# Patient Record
Sex: MALE | Race: WHITE | NOT HISPANIC OR LATINO | Employment: FULL TIME | URBAN - METROPOLITAN AREA
[De-identification: names, ages, dates, MRNs, and addresses within clinical notes are randomized per-mention and may not be internally consistent; named-entity substitution may affect disease eponyms.]

---

## 2023-08-31 ENCOUNTER — OFFICE VISIT (OUTPATIENT)
Dept: PRIMARY CARE | Facility: CLINIC | Age: 38
End: 2023-08-31
Payer: COMMERCIAL

## 2023-08-31 VITALS
BODY MASS INDEX: 27.4 KG/M2 | WEIGHT: 185 LBS | DIASTOLIC BLOOD PRESSURE: 65 MMHG | HEART RATE: 70 BPM | SYSTOLIC BLOOD PRESSURE: 102 MMHG | OXYGEN SATURATION: 97 % | TEMPERATURE: 97.9 F | HEIGHT: 69 IN

## 2023-08-31 DIAGNOSIS — Z00.00 ENCOUNTER FOR WELLNESS EXAMINATION: Primary | ICD-10-CM

## 2023-08-31 PROCEDURE — 1036F TOBACCO NON-USER: CPT | Performed by: INTERNAL MEDICINE

## 2023-08-31 PROCEDURE — 99395 PREV VISIT EST AGE 18-39: CPT | Performed by: INTERNAL MEDICINE

## 2023-08-31 ASSESSMENT — PROMIS GLOBAL HEALTH SCALE
EMOTIONAL_PROBLEMS: RARELY
CARRYOUT_SOCIAL_ACTIVITIES: VERY GOOD
RATE_AVERAGE_FATIGUE: MILD
RATE_QUALITY_OF_LIFE: VERY GOOD
RATE_AVERAGE_PAIN: 0
RATE_SOCIAL_SATISFACTION: GOOD
CARRYOUT_PHYSICAL_ACTIVITIES: COMPLETELY
RATE_PHYSICAL_HEALTH: VERY GOOD
RATE_GENERAL_HEALTH: VERY GOOD
RATE_MENTAL_HEALTH: GOOD

## 2023-08-31 NOTE — PROGRESS NOTES
"Subjective   Collin Reyes is a 38 y.o. male who presents for Annual Exam.  HPI    Interim:  - perforated eardrum, saw [facility] on 7/24/2023, has follow-up doing well    No significant past medical history.     Sees a therapist every few weeks, anxiety, controlled, never on medications.          He exercises regularly, some weight lifting. He golfs in the the summer, walks the course.  Diet is fairly healthy.     Social Hx: Lives with his wife and two children (3 and 5 y.o.) in [place]. He works as a . He is originally from [place], [place] area, but has lived in [place] since 2014.  Has 3-5 alcoholic drinks a week on average, social drinker.  Denies smoking cigarettes, he has smoked cigars before on rare special occasions, but not recently.       Objective   /65   Pulse 70   Temp 36.6 °C (97.9 °F)   Ht 1.753 m (5' 9\")   Wt 83.9 kg (185 lb)   SpO2 97%   BMI 27.32 kg/m²    Physical Exam  Gen: NAD, pleasant, A&;Ox3  HEENT: PERRL, EOMI, MMM, OP clear,L TM healing well, no perforation  Neck: supple, no thyromegaly, no JVD, normal carotid upstroke  Pulm: lungs CTAB, good air movement  CV: RRR, no m/r/g, 2+ DP pulses  Abd: NABS, soft, NT, ND no HSM  Ext: no peripheral edema  Neuro: CN II-XII intact, no focal sensory or motor deficits, normal reflexes    Assessment/Plan        General Health Maintenance:  -metabolic screen: Unremarkable, 2022  - Last colonoscopy: N/A  - PSA: no family hx prostate cancer, can offer baseline PSA in his 40s  -Smoking history: none, has smoked cigars once or twice a year in past on special occasion  -Counseled regarding diet and exercise  -Immunizations: Appears to be current, continue with annual influenza, possible COVID booster this year  -Follow up annually and PRN  Problem List Items Addressed This Visit    None           Gold Callahan MD   "

## 2023-11-05 PROBLEM — L82.1 OTHER SEBORRHEIC KERATOSIS: Status: ACTIVE | Noted: 2023-06-06

## 2023-11-05 PROBLEM — R06.83 SNORING: Status: ACTIVE | Noted: 2023-11-05

## 2023-11-05 PROBLEM — D22.60 MELANOCYTIC NEVI OF UNSPECIFIED UPPER LIMB, INCLUDING SHOULDER: Status: ACTIVE | Noted: 2023-06-06

## 2023-11-05 PROBLEM — L80 VITILIGO: Status: ACTIVE | Noted: 2023-06-06

## 2023-11-05 PROBLEM — D22.70 MELANOCYTIC NEVI OF LOWER LIMB, INCLUDING HIP: Status: ACTIVE | Noted: 2023-06-06

## 2023-11-05 PROBLEM — M25.512 LEFT SHOULDER PAIN: Status: ACTIVE | Noted: 2023-11-05

## 2023-11-05 PROBLEM — D18.01 HEMANGIOMA OF SKIN AND SUBCUTANEOUS TISSUE: Status: ACTIVE | Noted: 2023-06-06

## 2023-11-05 PROBLEM — J34.3 HYPERTROPHY OF NASAL TURBINATES: Status: ACTIVE | Noted: 2023-11-05

## 2023-11-05 PROBLEM — T14.8XXA PUNCTURE WOUND: Status: ACTIVE | Noted: 2023-11-05

## 2023-11-05 PROBLEM — H66.92 LEFT OTITIS MEDIA: Status: ACTIVE | Noted: 2023-11-05

## 2023-11-05 PROBLEM — S09.22XS: Status: ACTIVE | Noted: 2023-11-05

## 2023-11-05 PROBLEM — G47.19 EXCESSIVE DAYTIME SLEEPINESS: Status: ACTIVE | Noted: 2023-11-05

## 2023-11-05 PROBLEM — Z86.69 HISTORY OF TINNITUS: Status: ACTIVE | Noted: 2023-11-05

## 2023-11-05 PROBLEM — M79.602 LEFT ARM PAIN: Status: ACTIVE | Noted: 2023-11-05

## 2023-11-05 PROBLEM — D22.5 MELANOCYTIC NEVI OF TRUNK: Status: ACTIVE | Noted: 2023-06-06

## 2023-11-05 PROBLEM — G47.9 REPETITIVE INTRUSIONS OF SLEEP: Status: ACTIVE | Noted: 2023-11-05

## 2023-11-05 PROBLEM — K13.79 ELONGATED UVULA, ACQUIRED: Status: ACTIVE | Noted: 2023-11-05

## 2023-11-05 PROBLEM — L81.4 OTHER MELANIN HYPERPIGMENTATION: Status: ACTIVE | Noted: 2023-06-06

## 2023-11-05 PROBLEM — J34.2 NASAL SEPTAL DEVIATION: Status: ACTIVE | Noted: 2023-11-05

## 2023-11-07 ENCOUNTER — OFFICE VISIT (OUTPATIENT)
Dept: OTOLARYNGOLOGY | Facility: CLINIC | Age: 38
End: 2023-11-07
Payer: COMMERCIAL

## 2023-11-07 VITALS — WEIGHT: 185 LBS | BODY MASS INDEX: 27.32 KG/M2

## 2023-11-07 DIAGNOSIS — Z86.69 HISTORY OF PERFORATION OF TYMPANIC MEMBRANE: Primary | ICD-10-CM

## 2023-11-07 PROBLEM — S09.22XS: Status: RESOLVED | Noted: 2023-11-05 | Resolved: 2023-11-07

## 2023-11-07 PROCEDURE — 99213 OFFICE O/P EST LOW 20 MIN: CPT | Performed by: GENERAL PRACTICE

## 2023-11-07 PROCEDURE — 1036F TOBACCO NON-USER: CPT | Performed by: GENERAL PRACTICE

## 2023-11-07 ASSESSMENT — PATIENT HEALTH QUESTIONNAIRE - PHQ9
SUM OF ALL RESPONSES TO PHQ9 QUESTIONS 1 AND 2: 0
1. LITTLE INTEREST OR PLEASURE IN DOING THINGS: NOT AT ALL
2. FEELING DOWN, DEPRESSED OR HOPELESS: NOT AT ALL

## 2023-11-07 NOTE — PROGRESS NOTES
Otolaryngology - Head and Neck Surgery Outpatient New Patient Visit Note        Assessment/Plan   Problem List Items Addressed This Visit    None  Visit Diagnoses         Codes    History of perforation of tympanic membrane    -  Primary Z86.69            Well healed.  No subjective hearing loss.  Discussed possible audio but pt declines.         Follow up:  -plan for follow up in clinic as needed    All of the above findings, impressions, treatment planning and follow up plans were discussed with the patient who indicated understanding.  the patient was instructed to contact or return to clinic sooner if symptoms/signs persist or worsen despite the above management.      Hebert Bush MD  Otolaryngology - Head and Neck Surgery            History Of Present Illness  Collin Reyes is a 38 y.o. male presenting for eval of prior TM perforation on the left side.  Observed for resolution.   No ongoing hearing loss, otalgia, otorrhea, tinnitus.  Pt able to clear ears without difficulty.                Past Medical History  He has a past medical history of Anxiety, Migraine, unspecified, not intractable, without status migrainosus, Personal history of other specified conditions, and Snoring (03/01/2017).    Surgical History  He has a past surgical history that includes Appendectomy (07/13/2016); Uvulopalatopharyngoplasty (03/16/2019); Nasal septum surgery (08/30/2022); and Vasectomy.     Social History  He reports that he has never smoked. He has never used smokeless tobacco. He reports current alcohol use of about 2.0 standard drinks of alcohol per week. He reports current drug use. Frequency: 2.00 times per week. Drug: Marijuana.    Family History  Family History   Problem Relation Name Age of Onset    No Known Problems Mother      Hypertension Father      Diabetes Maternal Grandmother Herminia aVrner     Stroke Maternal Grandmother Herminia Varner     Stroke Paternal Grandmother Herminia Reyes     Diabetes Other Grandparent          Allergies  Penicillins    Review of Systems  ROS: Pertinent positives as noted in HPI.    - CONSTITUTIONAL: Does not report weight loss, fever or chills.    - HEENT:   Ear: Does not report tinnitus, vertigo, hearing loss, otalgia, otorrhea  Nose: Does not report congestion, rhinorrhea, epistaxis, decreased smell  Throat: Does not report pain, dysphagia, odynophagia  Larynx: Does not report hoarseness,  difficulty breathing, pain with speaking (odynophonia)  Neck: Does not report new masses, pain, swelling  Face: Does not report sinus pain, pressure, swelling, numbness, weakness     - RESPIRATORY: Does not report SOB or cough.    - CV: Does not report palpitations or chest pain.     - GI: Does not report abdominal pain, nausea, vomiting or diarrhea.    - : Does not report dysuria or urinary frequency.    - MSK: Does not report myalgia or joint pain.    - SKIN: Does not report rash or pruritus.    - NEUROLOGICAL: Does not report headache or syncope.    - PSYCHIATRIC: Does not report recent changes in mood. Does not report anxiety or depression.         Physical Exam:     GENERAL: Alert & Oriented to person, place and time; Normal affect and appearance. Well developed and well nourished. Conversant & cooperative with examination.      HEAD: Normocephalic, atraumatic. No sinus tenderness to palpation. Normal parotid bilaterally. Normal facial strength.      NEUROLOGIC: Cranial nerves II-XII grossly intact, gait WNL. Normal mood and affect.     EYES: Extraocular movements intact. Pupils equal, round, reactive to light and accommodation. No nystagmus, no ptosis. no scleral injection.     EAR: Normal auricle. No discomfort or TTP with manipulation. Otoscopic exam showed normal external auditory canals bilaterally. No purulence or EAC inflammation. Minimal cerumen. Right tympanic membrane clear and mobile without evidence of perforation, retraction or middle ear effusion. Left tympanic membrane hyperemic with no visible  perforation.  fofana midline with AC>BC b/l.      NOSE: No external deformity. No external nasal lesions, lacerations, or scars. Nasal tip symmetrical with normal nasal valves. Nasal cavity with rightward septum, normal mucosa and turbinates. No lesions, masses, purulence or polyps.      OC/OP: Mucous membranes moist, no masses, lesions or exudates. Normal tongue, floor of mouth, teeth, gums, lips. Normal posterior pharyngeal wall. Normal tonsils without erythema, exudate or obvious calculi      NECK: No neck masses or thyroid enlargement. Trachea midline. No tenderness to palpation     LYMPHATIC: No cervical lymphadenopathy.      RESPIRATORY: Symmetric chest elevation & no retractions. No significant hoarseness. No increased work of breathing.     CV: No clubbing or cyanosis. No obvious edema     Skin: no facial rashes, vesicles or lesions.      Extremities: no gross abnormalities      Clinic Procedure        Information review:  External sources (notes, imaging, lab results) listed below personally reviewed to aid in medical decision making process.  -  -  -

## 2024-06-06 ENCOUNTER — OFFICE VISIT (OUTPATIENT)
Dept: DERMATOLOGY | Facility: CLINIC | Age: 39
End: 2024-06-06
Payer: COMMERCIAL

## 2024-06-06 DIAGNOSIS — L81.4 LENTIGO: ICD-10-CM

## 2024-06-06 DIAGNOSIS — Z12.83 SCREENING EXAM FOR SKIN CANCER: ICD-10-CM

## 2024-06-06 DIAGNOSIS — D22.9 MULTIPLE BENIGN NEVI: Primary | ICD-10-CM

## 2024-06-06 DIAGNOSIS — L80 VITILIGO: ICD-10-CM

## 2024-06-06 PROBLEM — D22.60 MELANOCYTIC NEVI OF UNSPECIFIED UPPER LIMB, INCLUDING SHOULDER: Status: RESOLVED | Noted: 2023-06-06 | Resolved: 2024-06-06

## 2024-06-06 PROBLEM — L82.1 OTHER SEBORRHEIC KERATOSIS: Status: RESOLVED | Noted: 2023-06-06 | Resolved: 2024-06-06

## 2024-06-06 PROBLEM — D18.01 HEMANGIOMA OF SKIN AND SUBCUTANEOUS TISSUE: Status: RESOLVED | Noted: 2023-06-06 | Resolved: 2024-06-06

## 2024-06-06 PROBLEM — D22.70 MELANOCYTIC NEVI OF LOWER LIMB, INCLUDING HIP: Status: RESOLVED | Noted: 2023-06-06 | Resolved: 2024-06-06

## 2024-06-06 PROCEDURE — 99213 OFFICE O/P EST LOW 20 MIN: CPT | Performed by: DERMATOLOGY

## 2024-06-06 PROCEDURE — 1036F TOBACCO NON-USER: CPT | Performed by: DERMATOLOGY

## 2024-06-06 ASSESSMENT — PATIENT GLOBAL ASSESSMENT (PGA): PATIENT GLOBAL ASSESSMENT: PATIENT GLOBAL ASSESSMENT:  1 - CLEAR

## 2024-06-06 ASSESSMENT — DERMATOLOGY QUALITY OF LIFE (QOL) ASSESSMENT
ARE THERE EXCLUSIONS OR EXCEPTIONS FOR THE QUALITY OF LIFE ASSESSMENT: NO
RATE HOW BOTHERED YOU ARE BY EFFECTS OF YOUR SKIN PROBLEMS ON YOUR ACTIVITIES (EG, GOING OUT, ACCOMPLISHING WHAT YOU WANT, WORK ACTIVITIES OR YOUR RELATIONSHIPS WITH OTHERS): 0 - NEVER BOTHERED
DATE THE QUALITY-OF-LIFE ASSESSMENT WAS COMPLETED: 66997
WHAT SINGLE SKIN CONDITION LISTED BELOW IS THE PATIENT ANSWERING THE QUALITY-OF-LIFE ASSESSMENT QUESTIONS ABOUT: NONE OF THE ABOVE
RATE HOW EMOTIONALLY BOTHERED YOU ARE BY YOUR SKIN PROBLEM (FOR EXAMPLE, WORRY, EMBARRASSMENT, FRUSTRATION): 0 - NEVER BOTHERED
RATE HOW BOTHERED YOU ARE BY SYMPTOMS OF YOUR SKIN PROBLEM (EG, ITCHING, STINGING BURNING, HURTING OR SKIN IRRITATION): 0 - NEVER BOTHERED

## 2024-06-06 ASSESSMENT — ITCH NUMERIC RATING SCALE: HOW SEVERE IS YOUR ITCHING?: 0

## 2024-06-06 NOTE — PROGRESS NOTES
Subjective     Collin Reyes is a 39 y.o. male who presents for the following: Skin Check (Annual FBSE).     Last derm visit 6/6/23 for Full Skin Exam - halo nevi stable in the setting of vitiligo as compared to photography    Review of Systems:  No other skin or systemic complaints other than what is documented elsewhere in the note.    The following portions of the chart were reviewed this encounter and updated as appropriate:  Tobacco  Allergies  Meds  Problems  Med Hx  Surg Hx         Skin Cancer History  No skin cancer on file.      Specialty Problems          Dermatology Problems    Melanocytic nevi of trunk    Vitiligo    Puncture wound    Halo nevus        Objective   Well appearing patient in no apparent distress; mood and affect are within normal limits.    A full examination was performed including scalp, head, eyes, ears, nose, lips, neck, chest, axillae, abdomen, back, buttocks, bilateral upper extremities, bilateral lower extremities, hands, feet, fingers, toes, fingernails, and toenails. All findings within normal limits unless otherwise noted below.    Assessment/Plan   1. Multiple benign nevi  Brown and tan macules and papules with reassuring findings on dermoscopy    Halo nevi in the setting of known vitiligo stable. Cont to monitor clinically. Repeat photos taken in new system for higher resolution          -These lesions have benign, reassuring patterns on dermoscopy  -Recommend continued self observation, and to contact the office if any changes in nevi are noticed    2. Lentigo  Tan macules    -Benign appearing on exam  -Reassurance, recommend observation    3. Screening exam for skin cancer      Full body skin exam  -No lesions concerning for malignancy on the remainder the skin exam today   - The ugly duckling sign was discussed. Monitor for any skin lesions that are different in color, shape, or size than others on body  -Sun protection was discussed. Recommend SPF 30+, hats with  brims, sun protective clothing, and avoiding sun exposure between 10 AM and 2 PM whenever possible  -Recommend regular skin exams or sooner if new or changing lesions       Related Procedures  Follow Up In Dermatology - Established Patient    4. Vitiligo  Well-demarcated depigmented macule(s)/patch(es)    -Discussed nature of condition  -Reviewed treatment options  -Discussed associated photosensitivity with depigmented skin and the need for sun protection & avoidance to reduce the risk of skin cancer    - limited skin disease, declines treatment        Follow up 1 year Full Skin Exam or sooner as needed

## 2024-09-05 ENCOUNTER — APPOINTMENT (OUTPATIENT)
Dept: PRIMARY CARE | Facility: CLINIC | Age: 39
End: 2024-09-05
Payer: COMMERCIAL

## 2024-09-05 VITALS
DIASTOLIC BLOOD PRESSURE: 79 MMHG | WEIGHT: 180.6 LBS | OXYGEN SATURATION: 96 % | BODY MASS INDEX: 25.86 KG/M2 | SYSTOLIC BLOOD PRESSURE: 126 MMHG | HEIGHT: 70 IN | TEMPERATURE: 96.9 F | HEART RATE: 73 BPM

## 2024-09-05 DIAGNOSIS — Z00.00 ENCOUNTER FOR WELLNESS EXAMINATION: Primary | ICD-10-CM

## 2024-09-05 PROCEDURE — 3008F BODY MASS INDEX DOCD: CPT | Performed by: INTERNAL MEDICINE

## 2024-09-05 PROCEDURE — 99396 PREV VISIT EST AGE 40-64: CPT | Performed by: INTERNAL MEDICINE

## 2024-09-05 PROCEDURE — 1036F TOBACCO NON-USER: CPT | Performed by: INTERNAL MEDICINE

## 2024-09-05 ASSESSMENT — PAIN SCALES - GENERAL: PAINLEVEL: 0-NO PAIN

## 2024-09-05 NOTE — PROGRESS NOTES
"Subjective   Collin Reyes is a 39 y.o. male who presents for Annual Exam.  HPI    Interim:  - derm, 6/6/2024, skin check    No significant past medical history.     Sees a therapist every 4-6 weeks for anxiety, controlled, never on medications.        He is not exercising regularly.  He golfs in the the summer, walks the course.  Diet is fairly healthy.     Social Hx: Lives with his wife and two children (5yo daughter and 6 y.o. son) in [place]. He works as a . He is originally from [place], [place] area, but has lived in [place] since 2014.  Has 3-5 alcoholic drinks a week on average, social drinker.  Non-smoker.       Objective   /79 (BP Location: Left arm, Patient Position: Sitting, BP Cuff Size: Adult)   Pulse 73   Temp 36.1 °C (96.9 °F) (Temporal)   Ht 1.778 m (5' 10\")   Wt 81.9 kg (180 lb 9.6 oz)   SpO2 96%   BMI 25.91 kg/m²    Physical Exam  Gen: NAD, pleasant, A&;Ox3  HEENT: PERRL, EOMI, MMM, OP clear  Neck: supple, no thyromegaly, no JVD, normal carotid upstroke  Pulm: lungs CTAB, good air movement  CV: RRR, no m/r/g, 2+ DP pulses  Abd: NABS, soft, NT, ND no HSM  Ext: no peripheral edema  Neuro: CN II-XII intact, no focal sensory or motor deficits, normal reflexes    Assessment/Plan        General Health Maintenance:  -metabolic screen: Unremarkable, 2022  - Last colonoscopy: initiate at 43yo  - PSA: no family hx prostate cancer  -Smoking history: none  -Counseled regarding diet and exercise  -Immunizations: Appears to be current, continue with annual influenza, possible COVID booster this year  -Follow up annually and PRN  Problem List Items Addressed This Visit    None           Gold Callahan MD   "

## 2025-05-04 ENCOUNTER — HOSPITAL ENCOUNTER (EMERGENCY)
Facility: HOSPITAL | Age: 40
Discharge: HOME | End: 2025-05-04
Payer: COMMERCIAL

## 2025-05-04 VITALS
RESPIRATION RATE: 18 BRPM | TEMPERATURE: 98.1 F | WEIGHT: 181 LBS | BODY MASS INDEX: 25.91 KG/M2 | SYSTOLIC BLOOD PRESSURE: 123 MMHG | HEIGHT: 70 IN | OXYGEN SATURATION: 99 % | DIASTOLIC BLOOD PRESSURE: 82 MMHG | HEART RATE: 95 BPM

## 2025-05-04 DIAGNOSIS — Z23 NEED FOR TDAP VACCINATION: ICD-10-CM

## 2025-05-04 DIAGNOSIS — S61.211A LACERATION OF LEFT INDEX FINGER WITHOUT FOREIGN BODY WITHOUT DAMAGE TO NAIL, INITIAL ENCOUNTER: Primary | ICD-10-CM

## 2025-05-04 PROCEDURE — 90471 IMMUNIZATION ADMIN: CPT | Performed by: PHYSICIAN ASSISTANT

## 2025-05-04 PROCEDURE — 12001 RPR S/N/AX/GEN/TRNK 2.5CM/<: CPT | Performed by: PHYSICIAN ASSISTANT

## 2025-05-04 PROCEDURE — 2500000004 HC RX 250 GENERAL PHARMACY W/ HCPCS (ALT 636 FOR OP/ED): Mod: JZ | Performed by: PHYSICIAN ASSISTANT

## 2025-05-04 PROCEDURE — 2500000005 HC RX 250 GENERAL PHARMACY W/O HCPCS: Performed by: PHYSICIAN ASSISTANT

## 2025-05-04 PROCEDURE — 90715 TDAP VACCINE 7 YRS/> IM: CPT | Mod: JZ | Performed by: PHYSICIAN ASSISTANT

## 2025-05-04 PROCEDURE — 99283 EMERGENCY DEPT VISIT LOW MDM: CPT

## 2025-05-04 RX ORDER — BACITRACIN ZINC 500 UNIT/G
OINTMENT IN PACKET (EA) TOPICAL ONCE
Status: COMPLETED | OUTPATIENT
Start: 2025-05-04 | End: 2025-05-04

## 2025-05-04 RX ADMIN — BACITRACIN 1 APPLICATION: 500 OINTMENT TOPICAL at 18:26

## 2025-05-04 RX ADMIN — TETANUS TOXOID, REDUCED DIPHTHERIA TOXOID AND ACELLULAR PERTUSSIS VACCINE, ADSORBED 0.5 ML: 5; 2.5; 8; 8; 2.5 SUSPENSION INTRAMUSCULAR at 18:26

## 2025-05-04 ASSESSMENT — PAIN - FUNCTIONAL ASSESSMENT: PAIN_FUNCTIONAL_ASSESSMENT: 0-10

## 2025-05-04 ASSESSMENT — COLUMBIA-SUICIDE SEVERITY RATING SCALE - C-SSRS
6. HAVE YOU EVER DONE ANYTHING, STARTED TO DO ANYTHING, OR PREPARED TO DO ANYTHING TO END YOUR LIFE?: NO
2. HAVE YOU ACTUALLY HAD ANY THOUGHTS OF KILLING YOURSELF?: NO
1. IN THE PAST MONTH, HAVE YOU WISHED YOU WERE DEAD OR WISHED YOU COULD GO TO SLEEP AND NOT WAKE UP?: NO

## 2025-05-04 ASSESSMENT — PAIN SCALES - GENERAL: PAINLEVEL_OUTOF10: 1

## 2025-05-04 NOTE — ED TRIAGE NOTES
Pt was cutting bread and cut his left index finger on a knife. Pt denies any pain at this time bleeding controlled. Dressing placed in triage.

## 2025-05-04 NOTE — ED PROVIDER NOTES
HPI   Chief Complaint   Patient presents with    Laceration       Pleasant 40-year-old male lacerated his left index finger prior to arrival on a knife while chopping bread denies other injury or suspicion of foreign body able to move the finger.  Last tetanus shot was 2018.              Patient History   Medical History[1]  Surgical History[2]  Family History[3]  Social History[4]    Physical Exam   ED Triage Vitals [05/04/25 1747]   Temperature Heart Rate Respirations BP   36.7 °C (98.1 °F) 95 18 123/82      Pulse Ox Temp Source Heart Rate Source Patient Position   99 % Temporal -- --      BP Location FiO2 (%)     Right arm --       Physical Exam  Vitals and nursing note reviewed.   Constitutional:       General: He is not in acute distress.     Appearance: Normal appearance. He is normal weight. He is not ill-appearing, toxic-appearing or diaphoretic.   HENT:      Head: Normocephalic and atraumatic.      Right Ear: External ear normal.      Left Ear: External ear normal.      Nose: Nose normal.      Mouth/Throat:      Pharynx: No oropharyngeal exudate.   Eyes:      Extraocular Movements: Extraocular movements intact.      Conjunctiva/sclera: Conjunctivae normal.      Pupils: Pupils are equal, round, and reactive to light.   Pulmonary:      Effort: Pulmonary effort is normal. No respiratory distress.      Breath sounds: No stridor.   Abdominal:      General: There is no distension.      Tenderness: There is no right CVA tenderness, left CVA tenderness, guarding or rebound.   Musculoskeletal:         General: Tenderness and signs of injury present. No swelling or deformity.      Cervical back: Normal range of motion.   Skin:     General: Skin is warm.      Capillary Refill: Capillary refill takes less than 2 seconds.      Findings: No rash.   Neurological:      General: No focal deficit present.      Mental Status: He is alert and oriented to person, place, and time. Mental status is at baseline.      Cranial  Nerves: No cranial nerve deficit.      Sensory: No sensory deficit.      Motor: No weakness.      Coordination: Coordination normal.   Psychiatric:         Mood and Affect: Mood normal.         Behavior: Behavior normal.         Thought Content: Thought content normal.         Judgment: Judgment normal.           ED Course & MDM   Diagnoses as of 05/04/25 1833   Laceration of left index finger without foreign body without damage to nail, initial encounter   Need for Tdap vaccination                 No data recorded                                 Medical Decision Making  Ddx: fracture, lac, need for tdap.             Procedure  Laceration Repair    Performed by: Juan Edmonds PA-C  Authorized by: Juan Edmonds PA-C    Consent:     Consent obtained:  Verbal    Consent given by:  Patient    Risks discussed:  Infection, pain and retained foreign body    Alternatives discussed:  No treatment  Universal protocol:     Procedure explained and questions answered to patient or proxy's satisfaction: yes      Immediately prior to procedure, a time out was called: yes      Patient identity confirmed:  Verbally with patient  Laceration details:     Location:  Finger    Finger location:  L index finger    Length (cm):  2  Pre-procedure details:     Preparation:  Patient was prepped and draped in usual sterile fashion  Exploration:     Hemostasis achieved with:  Direct pressure    Wound exploration: wound explored through full range of motion and entire depth of wound visualized      Contaminated: no    Treatment:     Area cleansed with:  Saline    Amount of cleaning:  Standard    Irrigation solution:  Sterile saline  Skin repair:     Repair method:  Sutures    Suture size:  4-0    Suture material:  Nylon    Suture technique:  Simple interrupted    Number of sutures:  4  Repair type:     Repair type:  Simple  Post-procedure details:     Dressing:  Antibiotic ointment and adhesive bandage    Procedure completion:  Tolerated well,  no immediate complications         [1]   Past Medical History:  Diagnosis Date    Anxiety     Migraine, unspecified, not intractable, without status migrainosus     Migraine    Personal history of other specified conditions     History of chest pain    Snoring 03/01/2017    Snoring   [2]   Past Surgical History:  Procedure Laterality Date    APPENDECTOMY  07/13/2016    Appendectomy    NASAL SEPTUM SURGERY  08/30/2022    Nasal septoplasty    UVULOPALATOPHARYNGOPLASTY  03/16/2019    Uvulectomy    VASECTOMY     [3]   Family History  Problem Relation Name Age of Onset    No Known Problems Mother      Hypertension Father      Diabetes Maternal Grandmother Herminia Varner     Stroke Maternal Grandmother Herminia Varner     Stroke Paternal Grandmother Herminia Reyes     Diabetes Other Grandparent    [4]   Social History  Tobacco Use    Smoking status: Never    Smokeless tobacco: Never   Vaping Use    Vaping status: Some Days    Substances: THC   Substance Use Topics    Alcohol use: Yes     Alcohol/week: 2.0 standard drinks of alcohol     Types: 2 Standard drinks or equivalent per week    Drug use: Yes     Frequency: 2.0 times per week     Types: Marijuana     Comment: Lacie Edmonds PA-C  05/04/25 [id number]

## 2025-06-10 ENCOUNTER — APPOINTMENT (OUTPATIENT)
Dept: DERMATOLOGY | Facility: CLINIC | Age: 40
End: 2025-06-10
Payer: COMMERCIAL

## 2025-06-10 DIAGNOSIS — D22.9 MULTIPLE BENIGN NEVI: Primary | ICD-10-CM

## 2025-06-10 DIAGNOSIS — L80 VITILIGO: ICD-10-CM

## 2025-06-10 DIAGNOSIS — L81.4 LENTIGO: ICD-10-CM

## 2025-06-10 DIAGNOSIS — B35.6 TINEA CRURIS: ICD-10-CM

## 2025-06-10 DIAGNOSIS — Z12.83 SCREENING EXAM FOR SKIN CANCER: ICD-10-CM

## 2025-06-10 PROCEDURE — 99214 OFFICE O/P EST MOD 30 MIN: CPT | Performed by: DERMATOLOGY

## 2025-06-10 PROCEDURE — 1036F TOBACCO NON-USER: CPT | Performed by: DERMATOLOGY

## 2025-06-10 RX ORDER — KETOCONAZOLE 20 MG/ML
SHAMPOO, SUSPENSION TOPICAL DAILY
Qty: 120 ML | Refills: 11 | Status: SHIPPED | OUTPATIENT
Start: 2025-06-10

## 2025-06-10 RX ORDER — KETOCONAZOLE 20 MG/G
CREAM TOPICAL 2 TIMES DAILY
Qty: 60 G | Refills: 11 | Status: SHIPPED | OUTPATIENT
Start: 2025-06-10

## 2025-06-10 ASSESSMENT — PATIENT GLOBAL ASSESSMENT (PGA): PATIENT GLOBAL ASSESSMENT: PATIENT GLOBAL ASSESSMENT:  1 - CLEAR

## 2025-06-10 ASSESSMENT — DERMATOLOGY PATIENT ASSESSMENT
DO YOU HAVE ANY NEW OR CHANGING LESIONS: NO
HAVE YOU HAD OR DO YOU HAVE VASCULAR DISEASE: NO
DO YOU USE SUNSCREEN: DAILY
ARE YOU AN ORGAN TRANSPLANT RECIPIENT: NO
DO YOU USE A TANNING BED: NO
HAVE YOU HAD OR DO YOU HAVE A STAPH INFECTION: NO

## 2025-06-10 ASSESSMENT — DERMATOLOGY QUALITY OF LIFE (QOL) ASSESSMENT
RATE HOW EMOTIONALLY BOTHERED YOU ARE BY YOUR SKIN PROBLEM (FOR EXAMPLE, WORRY, EMBARRASSMENT, FRUSTRATION): 0 - NEVER BOTHERED
DATE THE QUALITY-OF-LIFE ASSESSMENT WAS COMPLETED: 67366
RATE HOW BOTHERED YOU ARE BY SYMPTOMS OF YOUR SKIN PROBLEM (EG, ITCHING, STINGING BURNING, HURTING OR SKIN IRRITATION): 0 - NEVER BOTHERED
ARE THERE EXCLUSIONS OR EXCEPTIONS FOR THE QUALITY OF LIFE ASSESSMENT: NO
RATE HOW BOTHERED YOU ARE BY EFFECTS OF YOUR SKIN PROBLEMS ON YOUR ACTIVITIES (EG, GOING OUT, ACCOMPLISHING WHAT YOU WANT, WORK ACTIVITIES OR YOUR RELATIONSHIPS WITH OTHERS): 0 - NEVER BOTHERED

## 2025-06-10 ASSESSMENT — ITCH NUMERIC RATING SCALE: HOW SEVERE IS YOUR ITCHING?: 0

## 2025-06-10 NOTE — Clinical Note
-These lesions have benign, reassuring patterns on dermoscopy  -Recommend continued self observation, and to contact the office if any changes in nevi are noticed   No protocol for requested medication.    Medication: ondansetron  Last office visit date: 11/12/24  Pharmacy: [facility] [id number] - [address]    Order pended, routed to clinician for review.

## 2025-06-10 NOTE — Clinical Note
-Discussed nature of condition  -Reviewed treatment options  -Discussed associated photosensitivity with depigmented skin and the need for sun protection & avoidance to reduce the risk of skin cancer    - limited skin disease, declines treatment

## 2025-06-10 NOTE — Clinical Note
Brown and tan macules and papules with reassuring findings on dermoscopy    Halo nevi in the setting of known vitiligo stable. Cont to monitor clinically. Repeat photos taken in new system for higher resolution 6/6/24. STABLE

## 2025-06-10 NOTE — PROGRESS NOTES
Subjective     Collin Reyes is a 40 y.o. male who presents for the following: Skin Check. Last derm visit 6/6/24 for Full Skin Exam. History of halo nevi.     Intake Questions  Do you have any new or changing Lesions?: No  Are you an organ transplant recipient?: No  Have you had or do you have a Staph Infection?: No  Have you had or do you have Vacular Disease?: No  Do you use sunscreen?: Daily  Do you use a tanning bed?: No    Review of Systems:  No other skin or systemic complaints other than what is documented elsewhere in the note.    The following portions of the chart were reviewed this encounter and updated as appropriate:  Tobacco  Allergies  Meds  Problems  Med Hx  Surg Hx         Skin Cancer History  Biopsy Log Book  No skin cancers from Specimen Tracking.    Additional History      Specialty Problems          Dermatology Problems    Melanocytic nevi of trunk    Vitiligo    Puncture wound    Halo nevus        Objective   Well appearing patient in no apparent distress; mood and affect are within normal limits.    A full examination was performed including scalp, head, eyes, ears, nose, lips, neck, chest, axillae, abdomen, back, buttocks, bilateral upper extremities, bilateral lower extremities, hands, feet, fingers, toes, fingernails, and toenails. All findings within normal limits unless otherwise noted below.    Assessment/Plan   Skin Exam  1. MULTIPLE BENIGN NEVI  Generalized  Brown and tan macules and papules with reassuring findings on dermoscopy    Halo nevi in the setting of known vitiligo stable. Cont to monitor clinically. Repeat photos taken in new system for higher resolution 6/6/24. STABLE   -These lesions have benign, reassuring patterns on dermoscopy  -Recommend continued self observation, and to contact the office if any changes in nevi are noticed  2. LENTIGO  Generalized  Tan macules  -Benign appearing on exam  -Reassurance, recommend observation  3. SCREENING EXAM FOR SKIN  CANCER  Generalized    Full body skin exam  -No lesions concerning for malignancy on the remainder the skin exam today   - The ugly duckling sign was discussed. Monitor for any skin lesions that are different in color, shape, or size than others on body  -Sun protection was discussed. Recommend SPF 30+, hats with brims, sun protective clothing, and avoiding sun exposure between 10 AM and 2 PM whenever possible  -Recommend regular skin exams or sooner if new or changing lesions     Related Procedures  Follow Up In Dermatology - Established Patient  Follow Up In Dermatology - Established Patient  4. VITILIGO  Generalized  Well-demarcated depigmented macule(s)/patch(es)  -Discussed nature of condition  -Reviewed treatment options  -Discussed associated photosensitivity with depigmented skin and the need for sun protection & avoidance to reduce the risk of skin cancer    - limited skin disease, declines treatment  5. TINEA CRURIS  Right Inguinal Area  Reticulated pink patches  Tinea cruris vs seborrheic dermatitis  - treatment with ketoconazole cream / shampoo   Related Medications  ketoconazole (NIZOral) 2 % cream  Apply topically 2 times a day. To affected areas of rash for 2-4 weeks as needed for rash then stop or continue on weekends for maintenance/prevention  ketoconazole (NIZOral) 2 % shampoo  Apply topically once daily. Use as body wash. Let sit for 5-10 minutes before rinsing. Use To affected areas of rash for 2-4 weeks as needed for rash then stop or continue on weekends for maintenance/prevention    Follow up 1 year Full Skin Exam

## 2025-09-11 ENCOUNTER — APPOINTMENT (OUTPATIENT)
Dept: PRIMARY CARE | Facility: CLINIC | Age: 40
End: 2025-09-11
Payer: COMMERCIAL

## 2026-01-15 ENCOUNTER — APPOINTMENT (OUTPATIENT)
Dept: PRIMARY CARE | Facility: CLINIC | Age: 41
End: 2026-01-15
Payer: COMMERCIAL

## 2026-06-10 ENCOUNTER — APPOINTMENT (OUTPATIENT)
Dept: DERMATOLOGY | Facility: CLINIC | Age: 41
End: 2026-06-10
Payer: COMMERCIAL